# Patient Record
Sex: FEMALE | Race: WHITE | URBAN - METROPOLITAN AREA
[De-identification: names, ages, dates, MRNs, and addresses within clinical notes are randomized per-mention and may not be internally consistent; named-entity substitution may affect disease eponyms.]

---

## 2019-12-17 ENCOUNTER — AMBULATORY - RIVER FALLS (OUTPATIENT)
Dept: FAMILY MEDICINE | Facility: CLINIC | Age: 19
End: 2019-12-17

## 2019-12-19 ENCOUNTER — COMMUNICATION - RIVER FALLS (OUTPATIENT)
Dept: FAMILY MEDICINE | Facility: CLINIC | Age: 19
End: 2019-12-19

## 2022-02-15 NOTE — NURSING NOTE
PPD Administration POC Entered On:  12/17/2019 3:01 PM CST    Performed On:  12/17/2019 2:50 PM CST by Aydee Nevarez CMA               PPD Administration   PPD Insertion Site :   Right forearm   PPD Amount Administered (mL) :   0.1 mL   Aydee Nevarez CMA - 12/17/2019 3:00 PM CST   Details   Collection Date :   12/17/2019 2:50 PM CST   Expiration Date :   5/22/2022 CDT   Lot#/Manufacture :   [id number]   Handling Specimen POC :   tubersol    :   [id number]   POC Test Comments :   Pt instructed to have read in 48-72 hours.  Pt voiced understanding   Aydee Nevarez CMA - 12/17/2019 3:00 PM CST

## 2022-02-15 NOTE — NURSING NOTE
PPD Reading POC Entered On:  12/19/2019 3:06 PM CST    Performed On:  12/19/2019 3:06 PM CST by Nevin Nation CMA               PPD Reading   PPD mm of Induration :   0 mm   PPD Interpretation :   Negative   Nevin Nation CMA - 12/19/2019 3:06 PM CST